# Patient Record
Sex: MALE | Race: BLACK OR AFRICAN AMERICAN
[De-identification: names, ages, dates, MRNs, and addresses within clinical notes are randomized per-mention and may not be internally consistent; named-entity substitution may affect disease eponyms.]

---

## 2018-02-16 ENCOUNTER — HOSPITAL ENCOUNTER (EMERGENCY)
Dept: HOSPITAL 96 - M.ERS | Age: 43
Discharge: HOME | End: 2018-02-16
Payer: COMMERCIAL

## 2018-02-16 VITALS — SYSTOLIC BLOOD PRESSURE: 177 MMHG | DIASTOLIC BLOOD PRESSURE: 102 MMHG

## 2018-02-16 VITALS — WEIGHT: 180.01 LBS | HEIGHT: 66 IN | BODY MASS INDEX: 28.93 KG/M2

## 2018-02-16 DIAGNOSIS — I10: ICD-10-CM

## 2018-02-16 DIAGNOSIS — F17.210: ICD-10-CM

## 2018-02-16 DIAGNOSIS — Z91.013: ICD-10-CM

## 2018-02-16 DIAGNOSIS — R07.89: Primary | ICD-10-CM

## 2018-02-16 DIAGNOSIS — F10.99: ICD-10-CM

## 2018-02-16 DIAGNOSIS — F12.20: ICD-10-CM

## 2018-02-16 DIAGNOSIS — J45.909: ICD-10-CM

## 2018-02-16 LAB
ABSOLUTE BASOPHILS: 0 THOU/UL (ref 0–0.2)
ABSOLUTE EOSINOPHILS: 0.2 THOU/UL (ref 0–0.7)
ABSOLUTE MONOCYTES: 0.6 THOU/UL (ref 0–1.2)
ALBUMIN SERPL-MCNC: 3.7 G/DL (ref 3.4–5)
ALP SERPL-CCNC: 93 U/L (ref 46–116)
ALT SERPL-CCNC: 27 U/L (ref 30–65)
ANION GAP SERPL CALC-SCNC: 1 MMOL/L (ref 7–16)
APTT BLD: 27.8 SECONDS (ref 25–31.3)
AST SERPL-CCNC: 20 U/L (ref 15–37)
BASOPHILS NFR BLD AUTO: 0.6 %
BILIRUB SERPL-MCNC: 0.4 MG/DL
BUN SERPL-MCNC: 16 MG/DL (ref 7–18)
CALCIUM SERPL-MCNC: 8.8 MG/DL (ref 8.5–10.1)
CHLORIDE SERPL-SCNC: 103 MMOL/L (ref 98–107)
CK-MB MASS: 1.4 NG/ML
CO2 SERPL-SCNC: 36 MMOL/L (ref 21–32)
CREAT SERPL-MCNC: 1.1 MG/DL (ref 0.6–1.3)
EOSINOPHIL NFR BLD: 3.3 %
GLUCOSE SERPL-MCNC: 138 MG/DL (ref 70–99)
GRANULOCYTES NFR BLD MANUAL: 63.3 %
HCT VFR BLD CALC: 41 % (ref 42–52)
HGB BLD-MCNC: 13.8 GM/DL (ref 14–18)
INR PPP: 1
LIPASE: 691 U/L (ref 73–393)
LYMPHOCYTES # BLD: 1.5 THOU/UL (ref 0.8–5.3)
LYMPHOCYTES NFR BLD AUTO: 23.8 %
MAGNESIUM SERPL-MCNC: 1.8 MG/DL (ref 1.8–2.4)
MCH RBC QN AUTO: 31 PG (ref 26–34)
MCHC RBC AUTO-ENTMCNC: 33.5 G/DL (ref 28–37)
MCV RBC: 92.4 FL (ref 80–100)
MONOCYTES NFR BLD: 9 %
MPV: 8.7 FL. (ref 7.2–11.1)
NEUTROPHILS # BLD: 4.1 THOU/UL (ref 1.6–8.1)
NT-PRO BRAIN NAT PEPTIDE: 61 PG/ML (ref ?–300)
NUCLEATED RBCS: 0 /100WBC
PLATELET COUNT*: 238 THOU/UL (ref 150–400)
POTASSIUM SERPL-SCNC: 3.6 MMOL/L (ref 3.5–5.1)
PROT SERPL-MCNC: 7.4 G/DL (ref 6.4–8.2)
PROTHROMBIN TIME: 10 SECONDS (ref 9.2–11.5)
RBC # BLD AUTO: 4.44 MIL/UL (ref 4.5–6)
RDW-CV: 13.7 % (ref 10.5–14.5)
SODIUM SERPL-SCNC: 140 MMOL/L (ref 136–145)
TROPONIN-I LEVEL: <0.06 NG/ML (ref ?–0.06)
WBC # BLD AUTO: 6.4 THOU/UL (ref 4–11)

## 2018-02-16 NOTE — EKG
[place] [address]
Phone:  [phone]                     ELECTROCARDIOGRAM REPORT      
_______________________________________________________________________________
 
Name:       LUISANA GUARDADO                   Room:                      [facility]#:  [id number]      Account #:      [id number]  
Admission:  18     Attend Phys:                         
Discharge:  18     Date of Birth:  [birth year]  
         Report #: [id number]
    [id number]
_______________________________________________________________________________
THIS REPORT FOR:  //name//                      
 
                         [facility] ED
                                       
Test Date:    2018               Test Time:    11:49:41
Pat Name:     LUISANA GUARDAOD             Department:   
Patient ID:   [id number]            Room:          
Gender:                               Technician:   JOSE JOHN
:          1975               Requested By: Praful Rosario
Order Number: [id number] MD:   Omega Bennett
                                 Measurements
Intervals                              Axis          
Rate:         94                       P:            78
CO:           176                      QRS:          85
QRSD:         72                       T:            -32
QT:           367                                    
QTc:          459                                    
                           Interpretive Statements
Sinus rhythm
Consider left atrial enlargement
Borderline T wave abnormalities
Compared to ECG 2016 11:22:53
T-wave abnormality now present
Sinus arrhythmia no longer present
Right-axis deviation no longer present
 
Electronically Signed On 2018 20:20:31 CST by Omega Bennett
[email or web address]
 
 
 
 
 
 
 
 
 
 
 
 
 
 
 
  <ELECTRONICALLY SIGNED>
                                           By: Omega Bennett MD, FACC   
  18
D: 18 1149   _____________________________________
T: 18 1149   Omega Bennett MD, [facility]     /EPI

## 2018-03-22 ENCOUNTER — HOSPITAL ENCOUNTER (OUTPATIENT)
Dept: HOSPITAL 96 - M.RAD | Age: 43
End: 2018-03-22
Attending: NURSE PRACTITIONER
Payer: COMMERCIAL

## 2018-03-22 DIAGNOSIS — M47.892: Primary | ICD-10-CM

## 2018-05-09 ENCOUNTER — HOSPITAL ENCOUNTER (EMERGENCY)
Dept: HOSPITAL 96 - M.ERS | Age: 43
Discharge: HOME | End: 2018-05-09
Payer: COMMERCIAL

## 2018-05-09 VITALS — HEIGHT: 67 IN | WEIGHT: 186 LBS | BODY MASS INDEX: 29.19 KG/M2

## 2018-05-09 VITALS — SYSTOLIC BLOOD PRESSURE: 153 MMHG | DIASTOLIC BLOOD PRESSURE: 113 MMHG

## 2018-05-09 DIAGNOSIS — J45.909: ICD-10-CM

## 2018-05-09 DIAGNOSIS — Z91.013: ICD-10-CM

## 2018-05-09 DIAGNOSIS — F17.210: ICD-10-CM

## 2018-05-09 DIAGNOSIS — I10: ICD-10-CM

## 2018-05-09 DIAGNOSIS — M19.90: ICD-10-CM

## 2018-05-09 DIAGNOSIS — R10.30: Primary | ICD-10-CM

## 2018-05-09 LAB
ABSOLUTE BASOPHILS: 0.1 THOU/UL (ref 0–0.2)
ABSOLUTE EOSINOPHILS: 0.3 THOU/UL (ref 0–0.7)
ABSOLUTE MONOCYTES: 0.8 THOU/UL (ref 0–1.2)
ALBUMIN SERPL-MCNC: 3.9 G/DL (ref 3.4–5)
ALP SERPL-CCNC: 88 U/L (ref 46–116)
ALT SERPL-CCNC: 27 U/L (ref 30–65)
ANION GAP SERPL CALC-SCNC: 5 MMOL/L (ref 7–16)
APTT BLD: 29 SECONDS (ref 25–31.3)
AST SERPL-CCNC: 27 U/L (ref 15–37)
BASOPHILS NFR BLD AUTO: 0.8 %
BILIRUB SERPL-MCNC: 0.7 MG/DL
BILIRUB UR-MCNC: NEGATIVE MG/DL
BUN SERPL-MCNC: 15 MG/DL (ref 7–18)
CALCIUM SERPL-MCNC: 9.3 MG/DL (ref 8.5–10.1)
CHLORIDE SERPL-SCNC: 102 MMOL/L (ref 98–107)
CO2 SERPL-SCNC: 33 MMOL/L (ref 21–32)
COLOR UR: YELLOW
CREAT SERPL-MCNC: 1.2 MG/DL (ref 0.6–1.3)
EOSINOPHIL NFR BLD: 3.1 %
GLUCOSE SERPL-MCNC: 99 MG/DL (ref 70–99)
GRANULOCYTES NFR BLD MANUAL: 64.6 %
HCT VFR BLD CALC: 42 % (ref 42–52)
HGB BLD-MCNC: 13.8 GM/DL (ref 14–18)
INR PPP: 1
KETONES UR STRIP-MCNC: NEGATIVE MG/DL
LIPASE: 156 U/L (ref 73–393)
LYMPHOCYTES # BLD: 2 THOU/UL (ref 0.8–5.3)
LYMPHOCYTES NFR BLD AUTO: 22.7 %
MCH RBC QN AUTO: 30.4 PG (ref 26–34)
MCHC RBC AUTO-ENTMCNC: 32.9 G/DL (ref 28–37)
MCV RBC: 92.5 FL (ref 80–100)
MONOCYTES NFR BLD: 8.8 %
MPV: 8.7 FL. (ref 7.2–11.1)
NEUTROPHILS # BLD: 5.7 THOU/UL (ref 1.6–8.1)
NUCLEATED RBCS: 0 /100WBC
PLATELET COUNT*: 256 THOU/UL (ref 150–400)
POTASSIUM SERPL-SCNC: 4.9 MMOL/L (ref 3.5–5.1)
PROT SERPL-MCNC: 8.2 G/DL (ref 6.4–8.2)
PROT UR QL STRIP: (no result)
PROTHROMBIN TIME: 9.6 SECONDS (ref 9.2–11.5)
RBC # BLD AUTO: 4.54 MIL/UL (ref 4.5–6)
RBC # UR STRIP: NEGATIVE /UL
RDW-CV: 13.8 % (ref 10.5–14.5)
SODIUM SERPL-SCNC: 140 MMOL/L (ref 136–145)
SP GR UR STRIP: 1.02 (ref 1–1.03)
TROPONIN-I LEVEL: <0.06 NG/ML (ref ?–0.06)
URINE CLARITY: CLEAR
URINE GLUCOSE-RANDOM: NEGATIVE
URINE LEUKOCYTES-REFLEX: NEGATIVE
URINE NITRITE-REFLEX: NEGATIVE
UROBILINOGEN UR STRIP-ACNC: 0.2 E.U./DL (ref 0.2–1)
WBC # BLD AUTO: 8.8 THOU/UL (ref 4–11)

## 2018-05-09 NOTE — EKG
[place] [address]
Phone:  [phone]                     ELECTROCARDIOGRAM REPORT      
_______________________________________________________________________________
 
Name:       LUISANA GUARDADO                   [id number]      Account #:      [id number]  
Admission:  18     Attend Phys:                         
Discharge:  18     Date of Birth:  [birth year]  
         Report #: [id number]
    [id number]
_______________________________________________________________________________
THIS REPORT FOR:  //name//                      
 
                         [facility] ED
                                       
Test Date:    2018               Test Time:    09:37:51
Pat Name:     LUISANA GUARDADO             Department:   
Patient ID:   [id number]            Room:          
Gender:       M                        Technician:   LUKAS
:          1975               Requested By: Praful Rosario
Order Number: [id number] MD:   Escobar Taylor
                                 Measurements
Intervals                              Axis          
Rate:         85                       P:            78
TN:           172                      QRS:          86
QRSD:         84                       T:            53
QT:           356                                    
QTc:          424                                    
                           Interpretive Statements
Sinus rhythm
Normal EKG
Electronically Signed On 2018 15:19:56 CDT by Escobar Taylor
[email or web address]
 
 
 
 
 
 
 
 
 
 
 
 
 
 
 
 
 
 
 
 
 
  <ELECTRONICALLY SIGNED>
                                           By: Escobar Taylor MD, [facility]     
  18     1519
D: 18 0937   _____________________________________
T: 18 0937   Escobar Taylor MD, FACC       /EPI

## 2019-12-23 ENCOUNTER — HOSPITAL ENCOUNTER (OUTPATIENT)
Dept: HOSPITAL 96 - M.RAD | Age: 44
End: 2019-12-23
Attending: PHYSICIAN ASSISTANT
Payer: COMMERCIAL

## 2019-12-23 DIAGNOSIS — M79.675: Primary | ICD-10-CM

## 2020-03-06 ENCOUNTER — HOSPITAL ENCOUNTER (EMERGENCY)
Dept: HOSPITAL 96 - M.ERS | Age: 45
Discharge: HOME | End: 2020-03-06
Payer: COMMERCIAL

## 2020-03-06 VITALS — SYSTOLIC BLOOD PRESSURE: 157 MMHG | DIASTOLIC BLOOD PRESSURE: 116 MMHG

## 2020-03-06 VITALS — BODY MASS INDEX: 29.73 KG/M2 | HEIGHT: 66 IN | WEIGHT: 185.01 LBS

## 2020-03-06 DIAGNOSIS — I10: Primary | ICD-10-CM

## 2020-03-06 DIAGNOSIS — Z91.013: ICD-10-CM

## 2020-03-06 DIAGNOSIS — M19.90: ICD-10-CM

## 2020-03-06 DIAGNOSIS — F17.210: ICD-10-CM

## 2020-03-06 DIAGNOSIS — R42: ICD-10-CM

## 2020-03-06 DIAGNOSIS — M10.9: ICD-10-CM

## 2020-03-06 DIAGNOSIS — J45.909: ICD-10-CM

## 2020-03-06 LAB
ABSOLUTE BASOPHILS: 0.1 THOU/UL (ref 0–0.2)
ABSOLUTE EOSINOPHILS: 0.1 THOU/UL (ref 0–0.7)
ABSOLUTE MONOCYTES: 0.6 THOU/UL (ref 0–1.2)
ALBUMIN SERPL-MCNC: 3.8 G/DL (ref 3.4–5)
ALP SERPL-CCNC: 88 U/L (ref 46–116)
ALT SERPL-CCNC: 48 U/L (ref 30–65)
ANION GAP SERPL CALC-SCNC: 6 MMOL/L (ref 7–16)
APTT BLD: 25.8 SECONDS (ref 25–31.3)
AST SERPL-CCNC: 31 U/L (ref 15–37)
BASOPHILS NFR BLD AUTO: 1.3 %
BILIRUB SERPL-MCNC: 0.5 MG/DL
BUN SERPL-MCNC: 15 MG/DL (ref 7–18)
CALCIUM SERPL-MCNC: 8.9 MG/DL (ref 8.5–10.1)
CHLORIDE SERPL-SCNC: 105 MMOL/L (ref 98–107)
CK-MB MASS: 3.8 NG/ML
CO2 SERPL-SCNC: 32 MMOL/L (ref 21–32)
CREAT SERPL-MCNC: 1.1 MG/DL (ref 0.6–1.3)
EOSINOPHIL NFR BLD: 2.5 %
GLUCOSE SERPL-MCNC: 95 MG/DL (ref 70–99)
GRANULOCYTES NFR BLD MANUAL: 52.7 %
HCT VFR BLD CALC: 40.5 % (ref 42–52)
HGB BLD-MCNC: 13.7 GM/DL (ref 14–18)
INR PPP: 0.9
LIPASE: 358 U/L (ref 73–393)
LYMPHOCYTES # BLD: 2 THOU/UL (ref 0.8–5.3)
LYMPHOCYTES NFR BLD AUTO: 33.7 %
MAGNESIUM SERPL-MCNC: 1.8 MG/DL (ref 1.8–2.4)
MCH RBC QN AUTO: 31.2 PG (ref 26–34)
MCHC RBC AUTO-ENTMCNC: 33.7 G/DL (ref 28–37)
MCV RBC: 92.7 FL (ref 80–100)
MONOCYTES NFR BLD: 9.8 %
MPV: 8.5 FL. (ref 7.2–11.1)
NEUTROPHILS # BLD: 3.1 THOU/UL (ref 1.6–8.1)
NT-PRO BRAIN NAT PEPTIDE: 24 PG/ML (ref ?–300)
NUCLEATED RBCS: 0 /100WBC
PLATELET COUNT*: 249 THOU/UL (ref 150–400)
POTASSIUM SERPL-SCNC: 4.2 MMOL/L (ref 3.5–5.1)
PROT SERPL-MCNC: 7.4 G/DL (ref 6.4–8.2)
PROTHROMBIN TIME: 9.6 SECONDS (ref 9.2–11.5)
RBC # BLD AUTO: 4.37 MIL/UL (ref 4.5–6)
RDW-CV: 14.2 % (ref 10.5–14.5)
SODIUM SERPL-SCNC: 143 MMOL/L (ref 136–145)
WBC # BLD AUTO: 6 THOU/UL (ref 4–11)

## 2021-12-27 ENCOUNTER — HOSPITAL ENCOUNTER (EMERGENCY)
Dept: HOSPITAL 96 - M.ERS | Age: 46
Discharge: LEFT BEFORE DISCHARGE | End: 2021-12-27
Payer: COMMERCIAL

## 2021-12-27 VITALS — BODY MASS INDEX: 28.25 KG/M2 | HEIGHT: 67 IN | WEIGHT: 180.01 LBS

## 2021-12-27 VITALS — SYSTOLIC BLOOD PRESSURE: 144 MMHG | DIASTOLIC BLOOD PRESSURE: 98 MMHG

## 2021-12-27 DIAGNOSIS — Z53.21: ICD-10-CM

## 2021-12-27 DIAGNOSIS — R07.89: Primary | ICD-10-CM

## 2021-12-27 DIAGNOSIS — M54.2: ICD-10-CM

## 2021-12-27 DIAGNOSIS — M79.602: ICD-10-CM

## 2021-12-27 DIAGNOSIS — R51.9: ICD-10-CM

## 2021-12-28 NOTE — EKG
[place] [address]
Phone:  [phone]                     ELECTROCARDIOGRAM REPORT      
_______________________________________________________________________________
 
Name:         LUISANA GUARDADO                  Room:                     [facility]#:    [id number]     Account #:     [id number]  
Admission:    21    Attend Phys:                     
Discharge:    21    Date of Birth: [birth year]  
Date of Service: 21 1509  Report #:      [id number]
        [id number]
_______________________________________________________________________________
THIS REPORT FOR:  //name//                      
 
                         [facility] ED
                                       
Test Date:    2021               Test Time:    15:09:26
Pat Name:     LUISANA GUARDADO             Department:   
Patient ID:   [id number]            Room:          
Gender:                               Technician:   
:          1975               Requested By: Robert Thompson
Order Number: [id number] MD:   Escobar Taylor
                                 Measurements
Intervals                              Axis          
Rate:         87                       P:            78
AZ:           167                      QRS:          87
QRSD:         83                       T:            52
QT:           355                                    
QTc:          427                                    
                           Interpretive Statements
Sinus rhythm
Compared to ECG 2020 11:45:01
No significant changes
Electronically Signed On 2021 9:48:00 CST by Escobar Taylor
[email or web address]
 
 
 
 
 
 
 
 
 
 
 
 
 
 
 
 
 
 
 
 
 
  <ELECTRONICALLY SIGNED>
                                           By: Escobar Taylor MD, [facility]     
  21     0948
D: 21 1509   _____________________________________
T: 21 1509   Escobar Taylor MD, FAC       /EPI